# Patient Record
Sex: FEMALE | Race: WHITE | HISPANIC OR LATINO | Employment: UNEMPLOYED | URBAN - NONMETROPOLITAN AREA
[De-identification: names, ages, dates, MRNs, and addresses within clinical notes are randomized per-mention and may not be internally consistent; named-entity substitution may affect disease eponyms.]

---

## 2022-10-19 ENCOUNTER — OFFICE VISIT (OUTPATIENT)
Dept: FAMILY MEDICINE | Facility: OTHER | Age: 52
End: 2022-10-19
Attending: FAMILY MEDICINE
Payer: COMMERCIAL

## 2022-10-19 VITALS
BODY MASS INDEX: 41.75 KG/M2 | RESPIRATION RATE: 20 BRPM | WEIGHT: 235.6 LBS | SYSTOLIC BLOOD PRESSURE: 128 MMHG | HEART RATE: 50 BPM | HEIGHT: 63 IN | DIASTOLIC BLOOD PRESSURE: 84 MMHG | TEMPERATURE: 96.7 F | OXYGEN SATURATION: 99 %

## 2022-10-19 DIAGNOSIS — K21.00 GASTROESOPHAGEAL REFLUX DISEASE WITH ESOPHAGITIS WITHOUT HEMORRHAGE: ICD-10-CM

## 2022-10-19 DIAGNOSIS — Z00.00 ROUTINE HISTORY AND PHYSICAL EXAMINATION OF ADULT: ICD-10-CM

## 2022-10-19 DIAGNOSIS — F12.10 CANNABIS ABUSE: ICD-10-CM

## 2022-10-19 DIAGNOSIS — K59.01 SLOW TRANSIT CONSTIPATION: ICD-10-CM

## 2022-10-19 DIAGNOSIS — M54.50 CHRONIC MIDLINE LOW BACK PAIN WITHOUT SCIATICA: ICD-10-CM

## 2022-10-19 DIAGNOSIS — F10.10 ALCOHOL ABUSE: ICD-10-CM

## 2022-10-19 DIAGNOSIS — G43.009 MIGRAINE WITHOUT AURA AND WITHOUT STATUS MIGRAINOSUS, NOT INTRACTABLE: ICD-10-CM

## 2022-10-19 DIAGNOSIS — G89.29 CHRONIC MIDLINE LOW BACK PAIN WITHOUT SCIATICA: ICD-10-CM

## 2022-10-19 DIAGNOSIS — E66.01 MORBID OBESITY (H): Primary | ICD-10-CM

## 2022-10-19 DIAGNOSIS — F15.10 METHAMPHETAMINE ABUSE (H): ICD-10-CM

## 2022-10-19 LAB
ALBUMIN SERPL-MCNC: 3.5 G/DL (ref 3.5–5.7)
ALP SERPL-CCNC: 150 U/L (ref 34–104)
ALT SERPL W P-5'-P-CCNC: 22 U/L (ref 7–52)
AST SERPL W P-5'-P-CCNC: 40 U/L (ref 13–39)
BILIRUB DIRECT SERPL-MCNC: 0.2 MG/DL (ref 0–0.2)
BILIRUB SERPL-MCNC: 0.9 MG/DL (ref 0.3–1)
PROT SERPL-MCNC: 6.9 G/DL (ref 6.4–8.9)

## 2022-10-19 PROCEDURE — 82248 BILIRUBIN DIRECT: CPT | Mod: ZL | Performed by: FAMILY MEDICINE

## 2022-10-19 PROCEDURE — 36415 COLL VENOUS BLD VENIPUNCTURE: CPT | Mod: ZL | Performed by: FAMILY MEDICINE

## 2022-10-19 PROCEDURE — 99386 PREV VISIT NEW AGE 40-64: CPT | Performed by: FAMILY MEDICINE

## 2022-10-19 PROCEDURE — G0463 HOSPITAL OUTPT CLINIC VISIT: HCPCS

## 2022-10-19 RX ORDER — BUSPIRONE HYDROCHLORIDE 5 MG/1
5 TABLET ORAL 3 TIMES DAILY
COMMUNITY

## 2022-10-19 RX ORDER — DICYCLOMINE HCL 20 MG
20 TABLET ORAL 4 TIMES DAILY PRN
COMMUNITY

## 2022-10-19 RX ORDER — MELOXICAM 15 MG/1
15 TABLET ORAL DAILY
COMMUNITY

## 2022-10-19 RX ORDER — PRAMIPEXOLE DIHYDROCHLORIDE 0.25 MG/1
0.25 TABLET ORAL AT BEDTIME
COMMUNITY
Start: 2021-11-02

## 2022-10-19 RX ORDER — METOPROLOL TARTRATE 50 MG
1 TABLET ORAL DAILY
COMMUNITY
Start: 2021-09-16

## 2022-10-19 RX ORDER — CYCLOBENZAPRINE HCL 5 MG
10 TABLET ORAL 3 TIMES DAILY PRN
COMMUNITY

## 2022-10-19 RX ORDER — IBUPROFEN 600 MG/1
600 TABLET, FILM COATED ORAL
COMMUNITY
Start: 2022-07-25

## 2022-10-19 RX ORDER — ASPIRIN 81 MG
100 TABLET, DELAYED RELEASE (ENTERIC COATED) ORAL DAILY
Qty: 30 TABLET | Refills: 1 | Status: SHIPPED | OUTPATIENT
Start: 2022-10-19

## 2022-10-19 RX ORDER — FUROSEMIDE 20 MG
20 TABLET ORAL DAILY
COMMUNITY
Start: 2022-07-07 | End: 2023-07-02

## 2022-10-19 RX ORDER — SUMATRIPTAN 50 MG/1
TABLET, FILM COATED ORAL
Qty: 30 TABLET | Refills: 3 | Status: SHIPPED | OUTPATIENT
Start: 2022-10-19

## 2022-10-19 RX ORDER — CYCLOBENZAPRINE HCL 10 MG
10 TABLET ORAL 3 TIMES DAILY PRN
Qty: 30 TABLET | Refills: 0 | Status: SHIPPED | OUTPATIENT
Start: 2022-10-19

## 2022-10-19 RX ORDER — PANTOPRAZOLE SODIUM 40 MG/1
40 TABLET, DELAYED RELEASE ORAL DAILY
Qty: 30 TABLET | Refills: 4 | Status: SHIPPED | OUTPATIENT
Start: 2022-10-19

## 2022-10-19 RX ORDER — LEVOCETIRIZINE DIHYDROCHLORIDE 5 MG/1
5 TABLET, FILM COATED ORAL AT BEDTIME
COMMUNITY
Start: 2021-11-02

## 2022-10-19 RX ORDER — SUMATRIPTAN 50 MG/1
TABLET, FILM COATED ORAL
COMMUNITY
Start: 2022-04-26 | End: 2022-10-19

## 2022-10-19 RX ORDER — SUCRALFATE 1 G/1
1 TABLET ORAL 4 TIMES DAILY
COMMUNITY

## 2022-10-19 RX ORDER — TOPIRAMATE 50 MG/1
TABLET, FILM COATED ORAL
COMMUNITY
Start: 2022-05-16

## 2022-10-19 RX ORDER — VALACYCLOVIR HYDROCHLORIDE 1 G/1
1000 TABLET, FILM COATED ORAL 2 TIMES DAILY
COMMUNITY

## 2022-10-19 ASSESSMENT — ENCOUNTER SYMPTOMS
MYALGIAS: 1
HEARTBURN: 1
ARTHRALGIAS: 1
ABDOMINAL PAIN: 1
BREAST MASS: 0
DIZZINESS: 1
CONSTIPATION: 1

## 2022-10-19 ASSESSMENT — ANXIETY QUESTIONNAIRES
GAD7 TOTAL SCORE: 14
1. FEELING NERVOUS, ANXIOUS, OR ON EDGE: SEVERAL DAYS
5. BEING SO RESTLESS THAT IT IS HARD TO SIT STILL: NEARLY EVERY DAY
IF YOU CHECKED OFF ANY PROBLEMS ON THIS QUESTIONNAIRE, HOW DIFFICULT HAVE THESE PROBLEMS MADE IT FOR YOU TO DO YOUR WORK, TAKE CARE OF THINGS AT HOME, OR GET ALONG WITH OTHER PEOPLE: SOMEWHAT DIFFICULT
GAD7 TOTAL SCORE: 14
3. WORRYING TOO MUCH ABOUT DIFFERENT THINGS: NEARLY EVERY DAY
2. NOT BEING ABLE TO STOP OR CONTROL WORRYING: SEVERAL DAYS
6. BECOMING EASILY ANNOYED OR IRRITABLE: NEARLY EVERY DAY
7. FEELING AFRAID AS IF SOMETHING AWFUL MIGHT HAPPEN: NOT AT ALL

## 2022-10-19 ASSESSMENT — PATIENT HEALTH QUESTIONNAIRE - PHQ9
SUM OF ALL RESPONSES TO PHQ QUESTIONS 1-9: 10
SUM OF ALL RESPONSES TO PHQ QUESTIONS 1-9: 10
5. POOR APPETITE OR OVEREATING: NEARLY EVERY DAY
10. IF YOU CHECKED OFF ANY PROBLEMS, HOW DIFFICULT HAVE THESE PROBLEMS MADE IT FOR YOU TO DO YOUR WORK, TAKE CARE OF THINGS AT HOME, OR GET ALONG WITH OTHER PEOPLE: NOT DIFFICULT AT ALL

## 2022-10-19 ASSESSMENT — PAIN SCALES - GENERAL: PAINLEVEL: SEVERE PAIN (7)

## 2022-10-19 NOTE — NURSING NOTE
Patient here for physical she is at inpatient treatment for meth, alcohol and medical THC. Last eye exam 2 years ago and last dental exam 3 years prior. She would like something PRN for anxiety. Medication Reconciliation: complete.    Jana Deluca LPN  10/19/2022 8:55 AM

## 2022-10-19 NOTE — LETTER
October 20, 2022      Clary Theresa  [address] [place] [address]        Dear ,    We are writing to inform you of your test results.    As you can see some of your liver functions are still little elevated.  I would recommend following up with your PCP when discharged.      Resulted Orders   Hepatic Function Panel   Result Value Ref Range    Bilirubin Total 0.9 0.3 - 1.0 mg/dL    Bilirubin Direct 0.2 0.0 - 0.2 mg/dL    Protein Total 6.9 6.4 - 8.9 g/dL    Albumin 3.5 3.5 - 5.7 g/dL    Alkaline Phosphatase 150 (H) 34 - 104 U/L    AST 40 (H) 13 - 39 U/L    ALT 22 7 - 52 U/L       If you have any questions or concerns, please call the clinic at the number listed above.       Sincerely,      Gonzalo Robles MD

## 2022-10-20 NOTE — PROGRESS NOTES
"  SUBJECTIVE:   Clary Cardenas is a 52 year old female who presents to clinic today for the following health issues:-Physical    Patient arrives here for [facility].  She has a history of methamphetamine abuse alcohol abuse THC abuse.  She reports that this is her first inpatient treatment.  Patient states that she lost her relationship with her kids.  And this is self admission.  Patient is from [facility].  Past history is significant for hysterectomy.  Review of systems is positive for constipation.  Patient reports that she started to use methamphetamine about 2 months ago.    Healthy Habits:     Getting at least 3 servings of Calcium per day:  NO    Bi-annual eye exam:  NO    Dental care twice a year:  NO    Sleep apnea or symptoms of sleep apnea:  None and Daytime drowsiness    Diet:  Breakfast skipped    Frequency of exercise:  None    Taking medications regularly:  Yes    Medication side effects:  Muscle aches and Other    PHQ-2 Total Score: 5    Additional concerns today:  No        Patient Active Problem List    Diagnosis Date Noted     Methamphetamine abuse (H) 10/19/2022     Priority: Medium     Morbid obesity (H) 10/19/2022     Priority: Medium     Alcohol abuse 10/19/2022     Priority: Medium     Cannabis abuse 10/19/2022     Priority: Medium     Gastroesophageal reflux disease with esophagitis without hemorrhage 10/19/2022     Priority: Medium     Migraine without aura and without status migrainosus, not intractable 10/19/2022     Priority: Medium     Chronic midline low back pain without sciatica 10/19/2022     Priority: Medium     Slow transit constipation 10/19/2022     Priority: Medium     No past medical history on file.   No past surgical history on file.  Allergies   Allergen Reactions     Acetaminophen-Codeine Nausea       Review of Systems     OBJECTIVE:     /84   Pulse 50   Temp (!) 96.7  F (35.9  C)   Resp 20   Ht 1.594 m (5' 2.75\")   Wt 106.9 kg (235 lb 9.6 oz)   SpO2 " 99%   BMI 42.07 kg/m    Body mass index is 42.07 kg/m .  Physical Exam  Constitutional:       Appearance: She is obese.   HENT:      Head: Normocephalic.      Mouth/Throat:      Mouth: Mucous membranes are dry.   Cardiovascular:      Rate and Rhythm: Regular rhythm.   Pulmonary:      Effort: Pulmonary effort is normal.      Breath sounds: Normal breath sounds.   Abdominal:      General: Abdomen is flat.   Skin:     General: Skin is warm.   Neurological:      General: No focal deficit present.      Mental Status: She is alert.   Psychiatric:         Mood and Affect: Mood normal.         Diagnostic Test Results:  Results for orders placed or performed in visit on 10/19/22   Hepatic Function Panel     Status: Abnormal   Result Value Ref Range    Bilirubin Total 0.9 0.3 - 1.0 mg/dL    Bilirubin Direct 0.2 0.0 - 0.2 mg/dL    Protein Total 6.9 6.4 - 8.9 g/dL    Albumin 3.5 3.5 - 5.7 g/dL    Alkaline Phosphatase 150 (H) 34 - 104 U/L    AST 40 (H) 13 - 39 U/L    ALT 22 7 - 52 U/L       ASSESSMENT/PLAN:         (E66.01) Morbid obesity (H)  (primary encounter diagnosis)        (Z00.00) Routine history and physical examination of adult  Advised patient on screening testing including mammogram.  Colonoscopy.  Patient elects to have it done when she gets back home.    (F15.10) Methamphetamine abuse (H)  I did discuss IV needles and discussed proceeding with hepatitis C HIV testing.  Patient declines.    (F10.10) Alcohol abuse  Comment: Liver functions continue to be slightly elevated.  Plan: Hepatic Function Panel            (F12.10) Cannabis abuse      (K21.00) Gastroesophageal reflux disease with esophagitis without hemorrhage  Commen refill  Plan: pantoprazole (PROTONIX) 40 MG EC tablet           (G43.009) Migraine without aura and without status migrainosus, not intractable  Comment refill  Plan: SUMAtriptan (IMITREX) 50 MG tablet      (M54.50,  G89.29) Chronic midline low back pain without sciatica  Comment refill  Plan:  cyclobenzaprine (FLEXERIL) 10 MG tablet        (K59.01) Slow transit constipation  Comment: Start  Plan: docusate sodium (COLACE) 100 MG tablet              Gonzalo Robles MD  [facility]  Answers for HPI/ROS submitted by the patient on 10/19/2022  If you checked off any problems, how difficult have these problems made it for you to do your work, take care of things at home, or get along with other people?: Not difficult at all  PHQ9 TOTAL SCORE: 10

## 2024-06-18 ENCOUNTER — MEDICAL CORRESPONDENCE (OUTPATIENT)
Dept: HEALTH INFORMATION MANAGEMENT | Facility: CLINIC | Age: 54
End: 2024-06-18
Payer: MEDICARE

## 2024-06-20 ENCOUNTER — REFERRAL (OUTPATIENT)
Dept: TRANSPLANT | Facility: CLINIC | Age: 54
End: 2024-06-20

## 2024-06-20 NOTE — LETTER
June 28, 2024    Clary Cardenas  [address]  [facility] [address]            Dear  Clary,     A referral was recently received on your behalf, by our Solid Organ Transplant Program.   We have made several attempts to get in touch with you but have been unable to reach you.  If you are interested and/or have any questions, please contact us at  [phone] or [phone] and ask to speak with an .      Monday - Friday, between the hours of 8:00am and 5:00pm central time.    We look forward to hearing from you.      Regards,     Solid Organ Transplant Intake Team  [address]  [address]  [place] [address]

## 2024-06-20 NOTE — Clinical Note
Boston Schuler(Pre-Liver team),  Intake team was unable to reach patient by phone, and TTRY letter. Patient was removed from RWB 1 week after letter was sent. Please review and close txp episode if appropriate.    Thanks,  Intake

## 2024-06-20 NOTE — Clinical Note
Boston Schuler,   Intake team was unable to reach patient by phone, and TTRY letter. Patient was removed from RWB 1 week after letter was sent.  Thanks,  Alanis

## 2024-07-22 NOTE — TELEPHONE ENCOUNTER
Called and LVM for daughter to call office with mother.    Patient phone does not work which is why no number is listed in demographics.    Spoke with referring clinic who reported having their own difficulties reaching patient as well, they were not surprised.  Clinic knew we have been trying and knew about the TTRY letter we sent.     Will keep open for a bit longer, and then close if nothing next week (week of 7/29)

## 2024-11-06 ASSESSMENT — PATIENT HEALTH QUESTIONNAIRE - PHQ9: SUM OF ALL RESPONSES TO PHQ QUESTIONS 1-9: 10

## 2025-04-24 PROBLEM — K76.82 HEPATIC ENCEPHALOPATHY (H): Status: ACTIVE | Noted: 2025-03-06

## 2025-04-24 PROBLEM — K65.2 SPONTANEOUS BACTERIAL PERITONITIS (H): Status: ACTIVE | Noted: 2025-03-17

## 2025-04-24 PROBLEM — F43.10 PTSD (POST-TRAUMATIC STRESS DISORDER): Status: ACTIVE | Noted: 2025-04-24

## 2025-04-24 PROBLEM — B18.2 CHRONIC HEPATITIS C (H): Status: ACTIVE | Noted: 2025-04-24

## 2025-04-24 PROBLEM — I10 ESSENTIAL (PRIMARY) HYPERTENSION: Status: ACTIVE | Noted: 2017-08-29

## 2025-04-24 PROBLEM — S09.90XA HEAD TRAUMA: Status: ACTIVE | Noted: 2025-04-24

## 2025-04-24 PROBLEM — Z86.14 HISTORY OF MRSA INFECTION: Status: ACTIVE | Noted: 2025-04-24

## 2025-04-24 NOTE — TELEPHONE ENCOUNTER
Closing referral at this time due to below.      From 4/1/25 discharge note:    HOSPITAL COURSE:  Patient was admitted for further management of decompensated cirrhosis with recurrent ascites. She was recently discharged from another facility where she was managed for SBP, hepatic encephalopathy and decompensated cirrhosis. She was significantly volume overloaded at presentation and was diuresed with good effect. Unfortunately, her renal function continued to decompensate despite aggressive measures including diuresis and hepatorenal protocol. Patient does not want to be started on hemodialysis. In addition, she has been persistently hypotensive requiring high dose of midodrine. Palliative care was consulted and patient elected to be discharged home with home hospice.       10/2/24 referring GI (Cody Benedict) note:    Pleasant 54-year-old female with a history of hepatitis C status posttreatment with a sustained viral response. She also has liver cirrhosis secondary to fatty liver as well as possibly related to her prior history of hepatitis C. She does have ascites. She had a recent paracentesis with removal of 1 L of fluid. Ultrasound today does not demonstrate any liver lesions or masses. There is a moderate amount of ascites present.    She is currently on Lasix 20 g daily as well as spironolactone 100 mg twice daily. She tries her best to follow 2 g sodium restricted diet.    She previously has had an elevated MELD score but is declining liver transplant evaluation.